# Patient Record
Sex: FEMALE | Race: OTHER | URBAN - METROPOLITAN AREA
[De-identification: names, ages, dates, MRNs, and addresses within clinical notes are randomized per-mention and may not be internally consistent; named-entity substitution may affect disease eponyms.]

---

## 2021-04-01 ENCOUNTER — IMPORTED ENCOUNTER (OUTPATIENT)
Dept: URBAN - METROPOLITAN AREA CLINIC 50 | Facility: CLINIC | Age: 81
End: 2021-04-01

## 2021-04-17 ASSESSMENT — VISUAL ACUITY
OD_BAT: 20/20
OS_BAT: 20/25
OS_CC: J1+
OS_OTHER: 20/25. 20/50.
OS_SC: 20/20
OD_OTHER: 20/20. 20/25.
OD_SC: 20/20
OD_CC: J1+

## 2021-04-17 ASSESSMENT — TONOMETRY
OD_IOP_MMHG: 15
OS_IOP_MMHG: 11
OS_IOP_MMHG: 15

## 2022-04-12 ENCOUNTER — PREPPED CHART (OUTPATIENT)
Dept: URBAN - METROPOLITAN AREA CLINIC 49 | Facility: CLINIC | Age: 82
End: 2022-04-12

## 2022-04-14 ENCOUNTER — COMPREHENSIVE EXAM (OUTPATIENT)
Dept: URBAN - METROPOLITAN AREA CLINIC 49 | Facility: CLINIC | Age: 82
End: 2022-04-14

## 2022-04-14 DIAGNOSIS — H35.373: ICD-10-CM

## 2022-04-14 DIAGNOSIS — H16.223: ICD-10-CM

## 2022-04-14 DIAGNOSIS — H35.362: ICD-10-CM

## 2022-04-14 DIAGNOSIS — D31.32: ICD-10-CM

## 2022-04-14 PROCEDURE — 92014 COMPRE OPH EXAM EST PT 1/>: CPT

## 2022-04-14 PROCEDURE — 92134 CPTRZ OPH DX IMG PST SGM RTA: CPT

## 2022-04-14 ASSESSMENT — VISUAL ACUITY
OU_CC: J1+
OD_SC: 20/20
OS_SC: 20/25+1

## 2022-04-14 ASSESSMENT — TONOMETRY
OD_IOP_MMHG: 15
OS_IOP_MMHG: 15

## 2022-12-20 NOTE — PATIENT DISCUSSION
Filled out DMV form - explained to pt and her daughter that although her VA is adequate, her mobility issues pose issues for driving, which was checked off on the form. Pt and daughter verbalized understanding.

## 2023-04-12 ENCOUNTER — COMPREHENSIVE EXAM (OUTPATIENT)
Dept: URBAN - METROPOLITAN AREA CLINIC 49 | Facility: CLINIC | Age: 83
End: 2023-04-12

## 2023-04-12 DIAGNOSIS — D31.32: ICD-10-CM

## 2023-04-12 DIAGNOSIS — H35.373: ICD-10-CM

## 2023-04-12 DIAGNOSIS — H16.223: ICD-10-CM

## 2023-04-12 DIAGNOSIS — H35.362: ICD-10-CM

## 2023-04-12 PROCEDURE — 92014 COMPRE OPH EXAM EST PT 1/>: CPT

## 2023-04-12 PROCEDURE — 92134 CPTRZ OPH DX IMG PST SGM RTA: CPT

## 2023-04-12 ASSESSMENT — TONOMETRY
OS_IOP_MMHG: 14
OD_IOP_MMHG: 14

## 2023-04-12 ASSESSMENT — VISUAL ACUITY
OS_SC: 20/25
OU_CC: J1+ @ 16"
OD_SC: 20/20-1

## 2024-04-17 ENCOUNTER — COMPREHENSIVE EXAM (OUTPATIENT)
Dept: URBAN - METROPOLITAN AREA CLINIC 49 | Facility: LOCATION | Age: 84
End: 2024-04-17

## 2024-04-17 DIAGNOSIS — D31.32: ICD-10-CM

## 2024-04-17 DIAGNOSIS — H18.413: ICD-10-CM

## 2024-04-17 DIAGNOSIS — H35.372: ICD-10-CM

## 2024-04-17 DIAGNOSIS — H16.223: ICD-10-CM

## 2024-04-17 DIAGNOSIS — H35.362: ICD-10-CM

## 2024-04-17 DIAGNOSIS — H35.412: ICD-10-CM

## 2024-04-17 PROCEDURE — 99214 OFFICE O/P EST MOD 30 MIN: CPT

## 2024-04-17 PROCEDURE — 92134 CPTRZ OPH DX IMG PST SGM RTA: CPT

## 2024-04-17 ASSESSMENT — TONOMETRY
OS_IOP_MMHG: 14
OD_IOP_MMHG: 14

## 2024-04-17 ASSESSMENT — VISUAL ACUITY
OS_SC: 20/25-1
OD_SC: 20/20-1

## 2025-01-04 NOTE — PATIENT DISCUSSION
"""Monitor ERM for changes. Informed patient of potential for worsening. Instructed patient to call with changes in vision. Recommend regular [place] grid checks.  """ Current diet order meets estimated nutrient requirements

## 2025-04-17 ENCOUNTER — COMPREHENSIVE EXAM (OUTPATIENT)
Age: 85
End: 2025-04-17

## 2025-04-17 DIAGNOSIS — H35.412: ICD-10-CM

## 2025-04-17 DIAGNOSIS — H35.372: ICD-10-CM

## 2025-04-17 DIAGNOSIS — H35.362: ICD-10-CM

## 2025-04-17 DIAGNOSIS — H16.223: ICD-10-CM

## 2025-04-17 DIAGNOSIS — D31.32: ICD-10-CM

## 2025-04-17 DIAGNOSIS — H18.413: ICD-10-CM

## 2025-04-17 DIAGNOSIS — H43.812: ICD-10-CM

## 2025-04-17 PROCEDURE — 92134 CPTRZ OPH DX IMG PST SGM RTA: CPT

## 2025-04-17 PROCEDURE — 99214 OFFICE O/P EST MOD 30 MIN: CPT
